# Patient Record
Sex: FEMALE | Race: WHITE | NOT HISPANIC OR LATINO | URBAN - METROPOLITAN AREA
[De-identification: names, ages, dates, MRNs, and addresses within clinical notes are randomized per-mention and may not be internally consistent; named-entity substitution may affect disease eponyms.]

---

## 2020-07-25 ENCOUNTER — TELEPHONE ENCOUNTER (OUTPATIENT)
Dept: URBAN - METROPOLITAN AREA CLINIC 13 | Facility: CLINIC | Age: 70
End: 2020-07-25

## 2020-07-25 RX ORDER — PANTOPRAZOLE SODIUM 40 MG/1
TAKE 1 TABLET DAILY TABLET, DELAYED RELEASE ORAL
Qty: 30 | Refills: 8 | OUTPATIENT
Start: 2017-12-18 | End: 2018-11-26

## 2020-07-25 RX ORDER — AMITRIPTYLINE HYDROCHLORIDE 10 MG/1
TABLET, FILM COATED ORAL
Refills: 0 | OUTPATIENT
End: 2018-06-18

## 2020-07-25 RX ORDER — DICLOFENAC SODIUM 75 MG/1
TABLET, DELAYED RELEASE ORAL
Refills: 0 | OUTPATIENT
End: 2018-07-30

## 2020-07-25 RX ORDER — LACTOBACILLUS RHAMNOSUS GG 10B CELL
CAPSULE ORAL
Refills: 0 | OUTPATIENT
End: 2018-02-19

## 2020-07-25 RX ORDER — METRONIDAZOLE 500 MG/1
TAKE 1 TABLET TWICE DAILY AS DIRECTED TABLET ORAL
Qty: 20 | Refills: 0 | OUTPATIENT
Start: 2018-02-19 | End: 2018-06-18

## 2020-07-26 ENCOUNTER — TELEPHONE ENCOUNTER (OUTPATIENT)
Dept: URBAN - METROPOLITAN AREA CLINIC 13 | Facility: CLINIC | Age: 70
End: 2020-07-26

## 2020-07-26 RX ORDER — NYSTATIN AND TRIAMCINOLONE ACETONIDE 100000; 1 MG/G; MG/G
APPLY A THIN FILM TO AFFECTED AREA TWO TIMES A DAY FOR 14 DAYS CREAM TOPICAL
Qty: 60 | Refills: 0 | Status: ACTIVE | COMMUNITY
Start: 2018-04-27

## 2020-07-26 RX ORDER — MULTIVIT-MIN/FOLIC/VIT K/LYCOP 400-300MCG
TAKE 1 TABLET DAILY TABLET ORAL
Refills: 0 | Status: ACTIVE | COMMUNITY

## 2020-07-26 RX ORDER — OMEPRAZOLE 20 MG/1
TAKE ONE CAPSULE BY MOUTH ONE TIME DAILY CAPSULE, DELAYED RELEASE ORAL
Qty: 90 | Refills: 3 | Status: ACTIVE | COMMUNITY
Start: 2019-01-02

## 2020-07-26 RX ORDER — HYDROCODONE BITARTRATE AND ACETAMINOPHEN 5; 325 MG/1; MG/1
TAKE ONE TABLET BY MOUTH AT BEDTIME AS NEEDED FOR PAIN TABLET ORAL
Qty: 15 | Refills: 0 | Status: ACTIVE | COMMUNITY
Start: 2018-06-13

## 2020-07-26 RX ORDER — LEVOTHYROXINE SODIUM 137 UG/1
TAKE 1 TABLET DAILY TABLET ORAL
Refills: 0 | Status: ACTIVE | COMMUNITY

## 2020-07-26 RX ORDER — METHOCARBAMOL 500 MG/1
TAKE TABLET  PRN TABLET, FILM COATED ORAL
Refills: 0 | Status: ACTIVE | COMMUNITY

## 2020-07-26 RX ORDER — GABAPENTIN 100 MG/1
CAPSULE ORAL
Qty: 90 | Refills: 0 | Status: ACTIVE | COMMUNITY
Start: 2018-08-30

## 2020-07-26 RX ORDER — HYDROCODONE BITARTRATE AND ACETAMINOPHEN 5; 325 MG/1; MG/1
TABLET ORAL
Qty: 15 | Refills: 0 | Status: ACTIVE | COMMUNITY
Start: 2018-02-12

## 2020-07-26 RX ORDER — AMPICILLIN 500 MG/1
TAKE ONE CAPSULE BY MOUTH THREE TIMES A DAY FOR 7 DAYS CAPSULE ORAL
Qty: 14 | Refills: 0 | Status: ACTIVE | COMMUNITY
Start: 2019-01-03

## 2020-07-26 RX ORDER — PREDNISONE 20 MG/1
TAKE THREE TABLETS BY MOUTH ONE TIME DAILY FOR 2 DAYS, THEN TAKE TWO T TABLET ORAL
Qty: 12 | Refills: 0 | Status: ACTIVE | COMMUNITY
Start: 2018-08-09

## 2020-07-26 RX ORDER — BUTALBITAL, ASPIRIN, CAFFEINE AND CODEINE PHOSPHATE 50; 325; 40; 30 MG/1; MG/1; MG/1; MG/1
TAKE ONE TO TWO CAPSULES BY MOUTH EVERY 4 HOURS AS NEEDED (NOT MORE TH CAPSULE ORAL
Qty: 30 | Refills: 0 | Status: ACTIVE | COMMUNITY
Start: 2018-08-01

## 2020-07-26 RX ORDER — LAMOTRIGINE 25 MG/1
TABLET ORAL
Qty: 240 | Refills: 0 | Status: ACTIVE | COMMUNITY
Start: 2019-01-21

## 2020-07-26 RX ORDER — INDOMETHACIN 25 MG/1
TAKE ONE CAPSULE BY MOUTH THREE TIMES A DAY, MAY INCREASE TO TAKE TWO CAPSULE ORAL
Qty: 6 | Refills: 0 | Status: ACTIVE | COMMUNITY
Start: 2018-09-24

## 2020-07-26 RX ORDER — ALPRAZOLAM 0.25 MG/1
TABLET ORAL
Qty: 30 | Refills: 0 | Status: ACTIVE | COMMUNITY
Start: 2018-08-30

## 2020-07-26 RX ORDER — SACCHAROMYCES BOULARDII 250 MG
TAKE AS DIRECTED CAPSULE ORAL
Refills: 0 | Status: ACTIVE | COMMUNITY

## 2020-07-26 RX ORDER — NITROFURANTOIN MONOHYDRATE/MACROCRYSTALLINE 25; 75 MG/1; MG/1
TAKE ONE CAPSULE BY MOUTH TWICE A DAY FOR 7 DAYS CAPSULE ORAL
Qty: 14 | Refills: 0 | Status: ACTIVE | COMMUNITY
Start: 2018-09-12

## 2020-07-26 RX ORDER — HYDROCODONE BITARTRATE AND ACETAMINOPHEN 7.5; 325 MG/1; MG/1
TABLET ORAL
Refills: 0 | Status: ACTIVE | COMMUNITY

## 2020-07-26 RX ORDER — LAMOTRIGINE 25 MG/1
TABLET ORAL
Qty: 240 | Refills: 0 | Status: ACTIVE | COMMUNITY
Start: 2018-10-16

## 2020-07-26 RX ORDER — HYDROCODONE BITARTRATE AND ACETAMINOPHEN 5; 325 MG/1; MG/1
TAKE ONE TO TWO TABLETS BY MOUTH EVERY 4 HOURS AS NEEDED FOR MODERATE TABLET ORAL
Qty: 15 | Refills: 0 | Status: ACTIVE | COMMUNITY
Start: 2018-08-20

## 2020-07-26 RX ORDER — NAPROXEN 500 MG/1
TAKE 1 TABLET 3 TIMES DAILY AS NEEDED. PT UNAWARE OF STRENGTH TABLET ORAL
Refills: 0 | Status: ACTIVE | COMMUNITY

## 2021-04-06 ENCOUNTER — OFFICE VISIT (OUTPATIENT)
Dept: URBAN - METROPOLITAN AREA CLINIC 72 | Facility: CLINIC | Age: 71
End: 2021-04-06
Payer: MEDICARE

## 2021-04-06 VITALS
RESPIRATION RATE: 18 BRPM | TEMPERATURE: 96.6 F | DIASTOLIC BLOOD PRESSURE: 78 MMHG | SYSTOLIC BLOOD PRESSURE: 136 MMHG | WEIGHT: 134.8 LBS | HEIGHT: 64 IN | BODY MASS INDEX: 23.01 KG/M2 | HEART RATE: 97 BPM

## 2021-04-06 DIAGNOSIS — K21.9 GASTROESOPHAGEAL REFLUX DISEASE, UNSPECIFIED WHETHER ESOPHAGITIS PRESENT: ICD-10-CM

## 2021-04-06 PROCEDURE — 99213 OFFICE O/P EST LOW 20 MIN: CPT | Performed by: INTERNAL MEDICINE

## 2021-04-06 RX ORDER — POLYETHYLENE GLYCOL 3350 17 G/17G
AS DIRECTED POWDER, FOR SOLUTION ORAL
Status: ACTIVE | COMMUNITY

## 2021-04-06 RX ORDER — METHOCARBAMOL 500 MG/1
TAKE TABLET  PRN TABLET, FILM COATED ORAL
Refills: 0 | Status: ACTIVE | COMMUNITY

## 2021-04-06 RX ORDER — HYDROCODONE BITARTRATE AND ACETAMINOPHEN 5; 325 MG/1; MG/1
1 TABLET AS NEEDED TABLET ORAL
Status: ACTIVE | COMMUNITY

## 2021-04-06 RX ORDER — GABAPENTIN 100 MG/1
CAPSULE ORAL
Qty: 90 | Refills: 0 | Status: DISCONTINUED | COMMUNITY
Start: 2018-08-30

## 2021-04-06 RX ORDER — SUMATRIPTAN 5 MG/.1ML
1 SPRAY AT ONSET OF HEADACHE IN ONE NOSTRIL MAY REPEAT DOSE AFTER 2 HOURS AS NEEDED SPRAY NASAL ONCE A DAY
Status: DISCONTINUED | COMMUNITY

## 2021-04-06 RX ORDER — PREDNISONE 20 MG/1
TAKE THREE TABLETS BY MOUTH ONE TIME DAILY FOR 2 DAYS, THEN TAKE TWO T TABLET ORAL
Qty: 12 | Refills: 0 | Status: DISCONTINUED | COMMUNITY
Start: 2018-08-09

## 2021-04-06 RX ORDER — BUTALBITAL, ASPIRIN, CAFFEINE AND CODEINE PHOSPHATE 50; 325; 40; 30 MG/1; MG/1; MG/1; MG/1
TAKE ONE TO TWO CAPSULES BY MOUTH EVERY 4 HOURS AS NEEDED (NOT MORE TH CAPSULE ORAL
Qty: 30 | Refills: 0 | Status: DISCONTINUED | COMMUNITY
Start: 2018-08-01

## 2021-04-06 RX ORDER — HYDROCODONE BITARTRATE AND ACETAMINOPHEN 7.5; 325 MG/1; MG/1
TABLET ORAL
Refills: 0 | Status: DISCONTINUED | COMMUNITY

## 2021-04-06 RX ORDER — NITROFURANTOIN MONOHYDRATE/MACROCRYSTALLINE 25; 75 MG/1; MG/1
TAKE ONE CAPSULE BY MOUTH TWICE A DAY FOR 7 DAYS CAPSULE ORAL
Qty: 14 | Refills: 0 | Status: ACTIVE | COMMUNITY
Start: 2018-09-12

## 2021-04-06 RX ORDER — SACCHAROMYCES BOULARDII 250 MG
TAKE AS DIRECTED CAPSULE ORAL
Refills: 0 | Status: DISCONTINUED | COMMUNITY

## 2021-04-06 RX ORDER — ALPRAZOLAM 0.25 MG/1
TABLET ORAL
Qty: 30 | Refills: 0 | Status: DISCONTINUED | COMMUNITY
Start: 2018-08-30

## 2021-04-06 RX ORDER — INDOMETHACIN 25 MG/1
TAKE ONE CAPSULE BY MOUTH THREE TIMES A DAY, MAY INCREASE TO TAKE TWO CAPSULE ORAL
Qty: 6 | Refills: 0 | Status: DISCONTINUED | COMMUNITY
Start: 2018-09-24

## 2021-04-06 RX ORDER — LEVOTHYROXINE SODIUM 137 UG/1
TAKE 1 TABLET DAILY TABLET ORAL
Refills: 0 | Status: DISCONTINUED | COMMUNITY

## 2021-04-06 RX ORDER — LAMOTRIGINE 100 MG/1
1 TABLET TABLET ORAL TWICE A DAY
Refills: 0 | Status: ACTIVE | COMMUNITY
Start: 2018-10-16

## 2021-04-06 RX ORDER — LEVOTHYROXINE SODIUM 0.03 MG/1
1 TABLET IN THE MORNING ON AN EMPTY STOMACH TABLET ORAL ONCE A DAY
Status: ACTIVE | COMMUNITY

## 2021-04-06 RX ORDER — MULTIVIT-MIN/FOLIC/VIT K/LYCOP 400-300MCG
TAKE 1 TABLET DAILY TABLET ORAL
Refills: 0 | Status: DISCONTINUED | COMMUNITY

## 2021-04-06 RX ORDER — HYDROCODONE BITARTRATE AND ACETAMINOPHEN 5; 325 MG/1; MG/1
TABLET ORAL
Qty: 15 | Refills: 0 | Status: DISCONTINUED | COMMUNITY
Start: 2018-02-12

## 2021-04-06 RX ORDER — OMEPRAZOLE 20 MG/1
TAKE ONE CAPSULE BY MOUTH ONE TIME DAILY CAPSULE, DELAYED RELEASE ORAL
Qty: 90 | Refills: 3 | Status: ACTIVE | COMMUNITY
Start: 2019-01-02

## 2021-04-06 RX ORDER — NYSTATIN AND TRIAMCINOLONE ACETONIDE 100000; 1 MG/G; MG/G
APPLY A THIN FILM TO AFFECTED AREA TWO TIMES A DAY FOR 14 DAYS CREAM TOPICAL
Qty: 60 | Refills: 0 | Status: DISCONTINUED | COMMUNITY
Start: 2018-04-27

## 2021-04-06 RX ORDER — SUMATRIPTAN SUCCINATE 50 MG/1
1 TABLET AT LEAST 2 HOURS BETWEEN DOSES AS NEEDED TABLET, FILM COATED ORAL TWICE A DAY
Status: DISCONTINUED | COMMUNITY

## 2021-04-06 RX ORDER — WHEAT DEXTRIN 3 G/3.5 G
AS DIRECTED POWDER (GRAM) ORAL
Status: ACTIVE | COMMUNITY

## 2021-04-06 RX ORDER — VERAPAMIL HYDROCHLORIDE 40 MG/1
1 TABLET TABLET, FILM COATED ORAL THREE TIMES A DAY
Status: ACTIVE | COMMUNITY

## 2021-04-06 RX ORDER — OMEPRAZOLE 20 MG/1
1 TABLET 30 MINUTES BEFORE MORNING MEAL CAPSULE, DELAYED RELEASE ORAL ONCE A DAY
Qty: 90 | Refills: 4
Start: 2019-01-02

## 2021-04-06 RX ORDER — AMPICILLIN 500 MG/1
TAKE ONE CAPSULE BY MOUTH THREE TIMES A DAY FOR 7 DAYS CAPSULE ORAL
Qty: 14 | Refills: 0 | Status: DISCONTINUED | COMMUNITY
Start: 2019-01-03

## 2021-04-06 RX ORDER — BUSPIRONE HYDROCHLORIDE 5 MG/1
1 TABLET TABLET ORAL THREE TIMES A DAY
Status: ACTIVE | COMMUNITY

## 2021-04-06 RX ORDER — PRASTERONE 6.5 MG/1
1 VAGINAL _INSERT AT BEDTIME INSERT VAGINAL
Status: ACTIVE | COMMUNITY

## 2021-04-06 RX ORDER — GLUCOSAMINE/CHONDR SU A SOD 750-600 MG
AS DIRECTED TABLET ORAL
Status: ACTIVE | COMMUNITY

## 2021-04-06 RX ORDER — NAPROXEN 500 MG/1
TAKE 1 TABLET 3 TIMES DAILY AS NEEDED. PT UNAWARE OF STRENGTH TABLET ORAL
Refills: 0 | Status: DISCONTINUED | COMMUNITY

## 2021-04-06 NOTE — HPI-TODAY'S VISIT:
Ms. Esquivel returns for follow-up.  She has a history of constipation predominant irritable bowel syndrome.  She was last seen by one of my partners in our office in February 2019. She underwent EGD due to complaint of belching, this is for form 1/2/2019, she had mild antral erosions and duodenal ulcers gastric biopsies were unremarkable and she was started on omeprazole 20 mg at the time of the procedure.  She reported improvement of her belching symptoms, she no heartburn, no reflux no nausea no vomiting.  She was taking MiraLAX and fiber as needed having one to 2 soft bowel movement a day with no blood.  She was using NSAIDs intermittently   She stopped medication and her belching returned. She has rare heartburn.

## 2021-04-09 ENCOUNTER — TELEPHONE ENCOUNTER (OUTPATIENT)
Dept: URBAN - METROPOLITAN AREA CLINIC 72 | Facility: CLINIC | Age: 71
End: 2021-04-09

## 2021-04-09 RX ORDER — OMEPRAZOLE 20 MG/1
1 TABLET 30 MINUTES BEFORE MORNING MEAL CAPSULE, DELAYED RELEASE ORAL ONCE A DAY
Qty: 90 | Refills: 4
Start: 2019-01-02

## 2021-04-28 ENCOUNTER — OFFICE VISIT (OUTPATIENT)
Dept: URBAN - METROPOLITAN AREA CLINIC 107 | Facility: CLINIC | Age: 71
End: 2021-04-28

## 2022-07-26 ENCOUNTER — TELEPHONE ENCOUNTER (OUTPATIENT)
Dept: URBAN - METROPOLITAN AREA CLINIC 72 | Facility: CLINIC | Age: 72
End: 2022-07-26

## 2022-07-26 RX ORDER — OMEPRAZOLE 20 MG/1
1 TABLET 30 MINUTES BEFORE MORNING MEAL CAPSULE, DELAYED RELEASE ORAL ONCE A DAY
Qty: 30 | Refills: 0
Start: 2019-01-02

## 2022-08-12 ENCOUNTER — DASHBOARD ENCOUNTERS (OUTPATIENT)
Age: 72
End: 2022-08-12

## 2022-08-12 ENCOUNTER — WEB ENCOUNTER (OUTPATIENT)
Dept: URBAN - METROPOLITAN AREA CLINIC 72 | Facility: CLINIC | Age: 72
End: 2022-08-12

## 2022-08-12 ENCOUNTER — OFFICE VISIT (OUTPATIENT)
Dept: URBAN - METROPOLITAN AREA CLINIC 72 | Facility: CLINIC | Age: 72
End: 2022-08-12
Payer: MEDICARE

## 2022-08-12 VITALS
HEART RATE: 93 BPM | HEIGHT: 64 IN | SYSTOLIC BLOOD PRESSURE: 123 MMHG | BODY MASS INDEX: 22.2 KG/M2 | WEIGHT: 130 LBS | DIASTOLIC BLOOD PRESSURE: 80 MMHG | TEMPERATURE: 97.8 F

## 2022-08-12 DIAGNOSIS — K21.9 GASTROESOPHAGEAL REFLUX DISEASE, UNSPECIFIED WHETHER ESOPHAGITIS PRESENT: ICD-10-CM

## 2022-08-12 PROCEDURE — 99214 OFFICE O/P EST MOD 30 MIN: CPT | Performed by: INTERNAL MEDICINE

## 2022-08-12 RX ORDER — WHEAT DEXTRIN 3 G/3.5 G
AS DIRECTED POWDER (GRAM) ORAL
Status: ACTIVE | COMMUNITY

## 2022-08-12 RX ORDER — BUSPIRONE HYDROCHLORIDE 5 MG/1
1 TABLET TABLET ORAL THREE TIMES A DAY
Status: ACTIVE | COMMUNITY

## 2022-08-12 RX ORDER — LEVOTHYROXINE SODIUM 0.03 MG/1
1 TABLET IN THE MORNING ON AN EMPTY STOMACH TABLET ORAL ONCE A DAY
Status: ACTIVE | COMMUNITY

## 2022-08-12 RX ORDER — METHOCARBAMOL 500 MG/1
TAKE TABLET  PRN TABLET, FILM COATED ORAL
Refills: 0 | Status: ACTIVE | COMMUNITY

## 2022-08-12 RX ORDER — HYDROCODONE BITARTRATE AND ACETAMINOPHEN 5; 325 MG/1; MG/1
1 TABLET AS NEEDED TABLET ORAL
Status: ACTIVE | COMMUNITY

## 2022-08-12 RX ORDER — PRASTERONE 6.5 MG/1
1 VAGINAL _INSERT AT BEDTIME INSERT VAGINAL
Status: ACTIVE | COMMUNITY

## 2022-08-12 RX ORDER — POLYETHYLENE GLYCOL 3350 17 G/17G
AS DIRECTED POWDER, FOR SOLUTION ORAL
Status: ACTIVE | COMMUNITY

## 2022-08-12 RX ORDER — OMEPRAZOLE 20 MG/1
1 TABLET 30 MINUTES BEFORE MORNING MEAL CAPSULE, DELAYED RELEASE ORAL ONCE A DAY
Qty: 90 | Refills: 3
Start: 2019-01-02

## 2022-08-12 RX ORDER — GLUCOSAMINE/CHONDR SU A SOD 750-600 MG
AS DIRECTED TABLET ORAL
Status: ACTIVE | COMMUNITY

## 2022-08-12 RX ORDER — UBROGEPANT 50 MG/1
1 TABLET MAY TAKE SECOND DOSE AT LEAST 2 HOURS AFTER FIRST DOSE AS NEEDED TABLET ORAL ONCE A DAY
Status: ACTIVE | COMMUNITY

## 2022-08-12 RX ORDER — LAMOTRIGINE 100 MG/1
1 TABLET TABLET ORAL TWICE A DAY
Refills: 0 | Status: ACTIVE | COMMUNITY
Start: 2018-10-16

## 2022-08-12 RX ORDER — OMEPRAZOLE 20 MG/1
1 TABLET 30 MINUTES BEFORE MORNING MEAL CAPSULE, DELAYED RELEASE ORAL ONCE A DAY
Qty: 30 | Refills: 0 | Status: ACTIVE | COMMUNITY
Start: 2019-01-02

## 2022-08-12 RX ORDER — NITROFURANTOIN MONOHYDRATE/MACROCRYSTALLINE 25; 75 MG/1; MG/1
TAKE ONE CAPSULE BY MOUTH TWICE A DAY FOR 7 DAYS CAPSULE ORAL
Qty: 14 | Refills: 0 | Status: ON HOLD | COMMUNITY
Start: 2018-09-12

## 2022-08-12 NOTE — HPI-TODAY'S VISIT:
Mrs. Esquivel returns for follow-up, last seen in office on 4/6/2021.  Has history of constipation predominant irritable bowel syndrome and GERD.  Underwent an EGD in 2019 due to complaint of belching that had mild antral erosions, duodenal ulcers however biopsies returned unremarkable and she was started on omeprazole 20 mg daily.  Her symptoms of belching improved she denies any heartburn and had no reflux.  When she stopped the medication her belching returned. In terms of her constipation she is maintained on fiber and MiraLAX as needed. Last visit we discussed long-term side effects of PPIs but encouraged her to continue this as it seem to resolve her symptoms and encouraged her to get bone density testing.  Recommended 1 year follow-up for medication management.  She reports she has been doing well, has only had 3 or 4 episodes of reflux with breakthrough which she has had to take something in addition to her omeprazole.  No issues with her constipation.  She believes her last colonoscopy was less than 10 years ago, does not believe she is due for repeat screening.

## 2022-08-19 ENCOUNTER — TELEPHONE ENCOUNTER (OUTPATIENT)
Dept: URBAN - METROPOLITAN AREA CLINIC 72 | Facility: CLINIC | Age: 72
End: 2022-08-19

## 2022-08-19 RX ORDER — OMEPRAZOLE 20 MG/1
1 TABLET 30 MINUTES BEFORE MORNING MEAL CAPSULE, DELAYED RELEASE ORAL ONCE A DAY
Qty: 90 | Refills: 0
Start: 2019-01-02